# Patient Record
Sex: FEMALE | Race: WHITE | Employment: FULL TIME | URBAN - METROPOLITAN AREA
[De-identification: names, ages, dates, MRNs, and addresses within clinical notes are randomized per-mention and may not be internally consistent; named-entity substitution may affect disease eponyms.]

---

## 2023-02-23 ENCOUNTER — HOSPITAL ENCOUNTER (EMERGENCY)
Age: 23
Discharge: HOME | End: 2023-02-23
Attending: GENERAL PRACTICE | Admitting: GENERAL PRACTICE

## 2023-02-23 VITALS
HEIGHT: 62 IN | BODY MASS INDEX: 27.6 KG/M2 | DIASTOLIC BLOOD PRESSURE: 77 MMHG | WEIGHT: 150 LBS | RESPIRATION RATE: 17 BRPM | SYSTOLIC BLOOD PRESSURE: 95 MMHG | OXYGEN SATURATION: 100 % | TEMPERATURE: 98.1 F | HEART RATE: 84 BPM

## 2023-02-23 DIAGNOSIS — K02.9 DENTAL CARIES: ICD-10-CM

## 2023-02-23 DIAGNOSIS — K08.89 PAIN, DENTAL: Primary | ICD-10-CM

## 2023-02-23 PROCEDURE — 99283 EMERGENCY DEPT VISIT LOW MDM: CPT | Performed by: SPECIALIST

## 2023-02-23 RX ORDER — AMOXICILLIN 500 MG/1
500 CAPSULE ORAL 2 TIMES DAILY
Qty: 20 CAPSULE | Refills: 0 | Status: SHIPPED | OUTPATIENT
Start: 2023-02-23 | End: 2023-03-05

## 2023-02-23 ASSESSMENT — ENCOUNTER SYMPTOMS
COLOR CHANGE: 0
SHORTNESS OF BREATH: 0
CHEST TIGHTNESS: 0
VOMITING: 0
NAUSEA: 0
DIARRHEA: 0
ABDOMINAL PAIN: 0

## 2023-02-23 ASSESSMENT — PAIN SCALES - GENERAL: PAINLEVEL_OUTOF10: 8

## 2023-02-23 NOTE — ED PROVIDER NOTES
Emergency Department Provider Note                   PCP:                Lea Reyes MD               Age: 23 y.o. Sex: female       ICD-10-CM    1. Pain, dental  K08.89       2. Dental caries  K02.9           DISPOSITION Decision To Discharge 02/23/2023 11:08:40 AM        Medical Decision Making  62-year-old female presenting with lower dental pain for the past day. Scheduled to see dentistry on Thursday but is unable to tolerate pain until then. Vitally stable upon arrival.  Exam with poor dentition throughout most notably in the lower right lower side. Several cracked teeth and advanced decay. No obvious abscess seen, although, this is believed to be the source of her pain. No acute distress currently. Stable for discharge home. I will place her on antibiotics and have her follow-up with dentistry as planned. Advised to take Tylenol/ibuprofen on top of this as needed. Return precautions discussed. Patient verbalizes understanding and is agreeable this plan. Risk  Prescription drug management. Complexity of Problem: 1 self limited or minor problem. (2)          No orders of the defined types were placed in this encounter. Medications - No data to display    Discharge Medication List as of 2/23/2023 11:42 AM        START taking these medications    Details   amoxicillin (AMOXIL) 500 MG capsule Take 1 capsule by mouth 2 times daily for 10 days, Disp-20 capsule, R-0Print              Sarah Sahni is a 23 y.o. female who presents to the Emergency Department with chief complaint of    Chief Complaint   Patient presents with    Dental Pain      62-year-old female with no prior medical history presenting to the emergency department for evaluation of lower dental pain since last night. States she has several cracked teeth and is scheduled to see a dentist next Thursday for evaluation of this problem. States that she has gone to the point where she can take the pain.   Rates her pain is approximately 8 out of 10 in severity today. Denies any fever, chills, nausea or vomiting. No difficulty swallowing. She has no other complaints today. The history is provided by the patient. Review of Systems   Constitutional:  Negative for chills, fatigue and fever. HENT:  Positive for dental problem. Eyes:  Negative for visual disturbance. Respiratory:  Negative for chest tightness and shortness of breath. Cardiovascular:  Negative for chest pain and palpitations. Gastrointestinal:  Negative for abdominal pain, diarrhea, nausea and vomiting. Genitourinary:  Negative for dysuria. Musculoskeletal:  Negative for arthralgias and myalgias. Skin:  Negative for color change and wound. Neurological:  Negative for dizziness, syncope, weakness, light-headedness and headaches. All other systems reviewed and are negative. No past medical history on file. No past surgical history on file. No family history on file. Social History     Socioeconomic History    Marital status: Single         Patient has no known allergies. Discharge Medication List as of 2/23/2023 11:42 AM           Vitals signs and nursing note reviewed. Patient Vitals for the past 4 hrs:   Temp Pulse Resp BP SpO2   02/23/23 1103 -- -- 17 -- --   02/23/23 1102 98.1 °F (36.7 °C) 84 -- 95/77 100 %          Physical Exam  Vitals and nursing note reviewed. Constitutional:       General: She is not in acute distress. Appearance: Normal appearance. She is not ill-appearing. HENT:      Head: Normocephalic and atraumatic. Right Ear: External ear normal.      Left Ear: External ear normal.      Nose: Nose normal.      Mouth/Throat:      Mouth: Mucous membranes are moist.      Pharynx: Oropharynx is clear. No oropharyngeal exudate or posterior oropharyngeal erythema. Comments: Poor dentition throughout most notably at the highlighted areas. Cracked teeth throughout.   Advanced decay of the premolars on the bottom right. No obvious abscess identified on exam.  Eyes:      General: No scleral icterus. Right eye: No discharge. Left eye: No discharge. Extraocular Movements: Extraocular movements intact. Conjunctiva/sclera: Conjunctivae normal.      Pupils: Pupils are equal, round, and reactive to light. Cardiovascular:      Rate and Rhythm: Normal rate and regular rhythm. Pulses: Normal pulses. Heart sounds: Normal heart sounds. Pulmonary:      Effort: Pulmonary effort is normal.      Breath sounds: Normal breath sounds. Abdominal:      General: Abdomen is flat. Palpations: Abdomen is soft. Tenderness: There is no abdominal tenderness. Musculoskeletal:         General: Normal range of motion. Cervical back: Normal range of motion and neck supple. Skin:     General: Skin is warm and dry. Neurological:      General: No focal deficit present. Mental Status: She is alert and oriented to person, place, and time. Psychiatric:         Mood and Affect: Mood normal.         Behavior: Behavior normal.        Procedures    No results found for any visits on 02/23/23. No orders to display                       Voice dictation software was used during the making of this note. This software is not perfect and grammatical and other typographical errors may be present. This note has not been completely proofread for errors.       [place], [place]  02/23/23 1770

## 2023-02-23 NOTE — ED NOTES
I have reviewed discharge instructions with the patient. The patient verbalized understanding. Patient left ED via Discharge Method: ambulatory to Home with self. Opportunity for questions and clarification provided. Patient given 1 scripts. To continue your aftercare when you leave the hospital, you may receive an automated call from our care team to check in on how you are doing. This is a free service and part of our promise to provide the best care and service to meet your aftercare needs.  If you have questions, or wish to unsubscribe from this service please call [phone]. Thank you for Choosing our [facility] Emergency Department.         Verona Ruano RN  02/23/23 1566

## 2023-02-23 NOTE — DISCHARGE INSTRUCTIONS
Take amoxicillin by mouth for dental infection.  Continue to take Tylenol and ibuprofen on top of this as well for breakthrough discomfort.  Plan to follow through with your dentist appointment next Thursday.  Please return with any worsening symptoms or concerns.

## 2023-02-23 NOTE — ED TRIAGE NOTES
Pt reports having a dentist apt on Thursday . Reports having 2 broken teeth and having worsening pain.  Reports increased pain when air hits the teeth

## 2023-02-23 NOTE — ED TRIAGE NOTES
Patient arrives with c/o dental pain. Stated morals are coming in, as well as having broken teeth. Dentist appt scheduled for thursday, yet could not deal with the pain until then.